# Patient Record
Sex: FEMALE
[De-identification: names, ages, dates, MRNs, and addresses within clinical notes are randomized per-mention and may not be internally consistent; named-entity substitution may affect disease eponyms.]

---

## 2017-09-05 ENCOUNTER — HOSPITAL ENCOUNTER (EMERGENCY)
Dept: HOSPITAL 74 - FER | Age: 42
Discharge: HOME | End: 2017-09-05
Payer: COMMERCIAL

## 2017-09-05 VITALS — BODY MASS INDEX: 24.9 KG/M2

## 2017-09-05 VITALS — HEART RATE: 80 BPM | SYSTOLIC BLOOD PRESSURE: 103 MMHG | TEMPERATURE: 98.1 F | DIASTOLIC BLOOD PRESSURE: 73 MMHG

## 2017-09-05 DIAGNOSIS — I10: ICD-10-CM

## 2017-09-05 DIAGNOSIS — R07.89: Primary | ICD-10-CM

## 2017-09-05 NOTE — PDOC
History of Present Illness





- General


Chief Complaint: Pain, Acute


Stated Complaint: CHEST PAIN


Time Seen by Provider: 09/05/17 22:45


History Source: Patient


Exam Limitations: No Limitations





- History of Present Illness


Initial Comments: 


09/05/17 23:20


This is a 42-year-old female with history of hypertension otherwise she is 

healthy. Patient is complaining of approximately 1 hour pleuritic type chest 

pain. Patient denies any cough, congestion, fever, chills. Patient denies any 

recent viral illness. Patient said that she has small children at home and was 

at [employer] today and so is been doing a fair amount of lifting. Patient said pain 

is exacerbated with taking a deep breath and with movement. Patient denies any 

radiation, nausea, diaphoresis or any other associated symptoms. Patient says 

she is not short of breath.





PAST MEDICAL HISTORY:  no significant history





PAST SURGICAL HISTORY:  no significant history





FAMILY HISTORY:  no pertinant history





SOCIAL HISTORY:  Pt lives with  family and is employed.





MEDICATIONS:  reviewed





ALLERGIES:  As per nursing notes





Review of Systems


General:  No fevers or chills, no weakness, no weight loss 


HEENT: No change in vision.  No sore throat,. No ear pain


CardioVascular:  Pleuritic type chest pain


Respiratory:No cough, or wheezing. 


Gastrointestinal:  no nausea, vomitting, diarrhea or constipation,  No rectal 

bleeding


Genitourinary:  No dysuria, hematuria, or frequency


Musculoskeletal:  No joint or muscle pain or swelling


Neurologic: No headache, vertigo, dizziness or loss of consciousness


Psychiatric: nor depression 


Skin: No rashes or easy bruising


Endocrine: no increased thirst or abnormal weight change


Allergic: no skin or latex allergy


All other systems reviewed and normal








Exam:





General: Well-nourished well-developed individual, no acute distress


HEENT: Throat: Normal, tonsils normal, no erythema or exudate


               Neck: Supple, no meningeal signs, no lymphadenopathy


Eyes::Pupils equal reactive and round, extraocular motion intact


Chest: Pain is reproduced on palpation, 


Cardiac: S1-S2 normal, regular rate and rhythm, no murmurs rubs or gallops


Respiratory: Lungs clear to auscultation bilateral


Abdomen: Soft, nondistended, normal bowel sounds, nontender to palpation 

diffusely


Extremities: Warm, dry, no cyanosis, clubbing, or edema


Skin: No rashes


Neuro: Alert and oriented x3, nonfocal exam, grossly intact, normal gait


Psych: Normal mood and affect





EKG normal sinus rhythm at a rate of 80 no acute ST-T wave changes, normal 

intervals, normal EKG





Assessment and plan: This is a 42-year-old female with pleuritic type chest 

pain. Patient had a normal EKG and her only risk factor for coronary artery 

disease is she reports a history of hypertension however is not on any 

medication for it and her blood pressure here in the emergency room was 103 

systolic. Patient given an anti-inflammatory and told to continue it and follow-

up with her primary care doctor.














Past History





- Past Medical History


Allergies/Adverse Reactions: 


 Allergies











Allergy/AdvReac Type Severity Reaction Status Date / Time


 


No Known Allergies Allergy   Verified 09/05/17 22:49











Home Medications: 


Ambulatory Orders





NK [No Known Home Medication]  09/05/17 











*Physical Exam





- Vital Signs


 Last Vital Signs











Temp Pulse Resp BP Pulse Ox


 


 98.1 F   80   16   103/73   100 


 


 09/05/17 22:50  09/05/17 22:50  09/05/17 22:50  09/05/17 22:50  09/05/17 22:50














ED Treatment Course





- Medications


Given in the ED: 


ED Medications














Discontinued Medications














Generic Name Dose Route Start Last Admin





  Trade Name Freq  PRN Reason Stop Dose Admin


 


Naproxen  500 mg 09/05/17 23:18 09/05/17 23:18





  Naprosyn -  PO 09/05/17 23:19  500 mg





  NOW ONE   Administration














*DC/Admit/Observation/Transfer


Diagnosis at time of Disposition: 


 Pleuritic chest pain





- Discharge Dispostion


Disposition: HOME


Condition at time of disposition: Stable


Admit: No





- Patient Instructions


Additional Instructions: 


Take Naprosyn over-the-counter is Aleve 2 tablets twice a day with food for the 

next week. If not improved in one week follow-up with your primary care doctor.





Return to the emergency department immediately with ANY new, persistent or 

worsening symptoms.





Continue any medications as previously prescribed by your physician.


.


Please make sure your doctor reviews the results of your emergency evaluation.





Thank you for coming to the   Emergency Department today for your care. It was 

a pleasure to see you today. Please note that your evaluation is INCOMPLETE 

until you  follow-up with your doctor.

## 2017-09-06 NOTE — EKG
Test Reason : 

Blood Pressure : ***/*** mmHG

Vent. Rate : 080 BPM     Atrial Rate : 080 BPM

   P-R Int : 160 ms          QRS Dur : 086 ms

    QT Int : 368 ms       P-R-T Axes : 067 020 050 degrees

   QTc Int : 424 ms

 

NORMAL SINUS RHYTHM

 

NO PREVIOUS ECGS AVAILABLE

Confirmed by MD TRACEY, LUZ MARINA ([id number]) on 9/6/2017 2:16:22 PM

 

Referred By: MD DEL TORO           Confirmed By:LUZ MARINA WEBB MD

## 2018-05-27 ENCOUNTER — HOSPITAL ENCOUNTER (EMERGENCY)
Dept: HOSPITAL 74 - FER | Age: 43
Discharge: HOME | End: 2018-05-27
Payer: COMMERCIAL

## 2018-05-27 VITALS — SYSTOLIC BLOOD PRESSURE: 119 MMHG | TEMPERATURE: 98.9 F | HEART RATE: 67 BPM | DIASTOLIC BLOOD PRESSURE: 65 MMHG

## 2018-05-27 DIAGNOSIS — J40: Primary | ICD-10-CM

## 2018-05-27 LAB
ALBUMIN SERPL-MCNC: 3.7 G/DL (ref 3.5–5)
ALP SERPL-CCNC: 40 U/L (ref 32–92)
ALT SERPL-CCNC: 10 U/L (ref 10–40)
AMORPH PHOS CRY URNS QL MICRO: (no result) /HPF
ANION GAP SERPL CALC-SCNC: 7 MMOL/L (ref 8–16)
APPEARANCE UR: (no result)
AST SERPL-CCNC: 19 U/L (ref 10–42)
BACTERIA #/AREA URNS HPF: (no result) /HPF
BILIRUB SERPL-MCNC: 0.3 MG/DL (ref 0.2–1)
BILIRUB UR STRIP.AUTO-MCNC: (no result) MG/DL
BUN SERPL-MCNC: 9 MG/DL (ref 7–18)
CALCIUM SERPL-MCNC: 8.4 MG/DL (ref 8.4–10.2)
CHLORIDE SERPL-SCNC: 101 MMOL/L (ref 98–107)
CO2 SERPL-SCNC: 25 MMOL/L (ref 22–28)
COLOR UR: YELLOW
CREAT SERPL-MCNC: 0.7 MG/DL (ref 0.6–1.3)
DEPRECATED RDW RBC AUTO: 12.2 % (ref 11.6–15.6)
EPITH CASTS URNS QL MICRO: (no result) /HPF
GLUCOSE SERPL-MCNC: 115 MG/DL (ref 74–106)
HCT VFR BLD CALC: 35.8 % (ref 32.4–45.2)
HGB BLD-MCNC: 12.4 GM/DL (ref 10.7–15.3)
KETONES UR QL STRIP: (no result)
LEUKOCYTE ESTERASE UR QL STRIP.AUTO: (no result)
MCH RBC QN AUTO: 30.3 PG (ref 25.7–33.7)
MCHC RBC AUTO-ENTMCNC: 34.7 G/DL (ref 32–36)
MCV RBC: 87.4 FL (ref 80–96)
PH UR: 6 [PH] (ref 4.5–8)
PLATELET # BLD AUTO: 134 K/MM3 (ref 134–434)
PMV BLD: 9.1 FL (ref 7.5–11.1)
POTASSIUM SERPLBLD-SCNC: 3.5 MMOL/L (ref 3.5–5.1)
PROT SERPL-MCNC: 7.1 G/DL (ref 6.4–8.3)
PROT UR QL STRIP: (no result)
RBC # BLD AUTO: (no result) /HPF (ref 0–3)
RBC # BLD AUTO: 4.09 M/MM3 (ref 3.6–5.2)
SODIUM SERPL-SCNC: 133 MMOL/L (ref 136–145)
SP GR UR: 1.02 (ref 1–1.02)
UROBILINOGEN UR STRIP-MCNC: 0.2 MG/DL (ref 0.2–1)
WBC # BLD AUTO: 3.2 K/MM3 (ref 4–10.8)

## 2018-05-27 PROCEDURE — 3E0337Z INTRODUCTION OF ELECTROLYTIC AND WATER BALANCE SUBSTANCE INTO PERIPHERAL VEIN, PERCUTANEOUS APPROACH: ICD-10-PCS

## 2018-05-27 PROCEDURE — 3E033NZ INTRODUCTION OF ANALGESICS, HYPNOTICS, SEDATIVES INTO PERIPHERAL VEIN, PERCUTANEOUS APPROACH: ICD-10-PCS

## 2018-05-27 PROCEDURE — 3E033GC INTRODUCTION OF OTHER THERAPEUTIC SUBSTANCE INTO PERIPHERAL VEIN, PERCUTANEOUS APPROACH: ICD-10-PCS

## 2018-05-27 NOTE — PDOC
History of Present Illness





- General


Chief Complaint: Respiratory


Stated Complaint: COUGH & COLD SX





- History of Present Illness


Initial Comments: 





05/27/18 12:04


42yo female presents c/o body aches, fevers, and a cough productive of green 

sputum. Pt states her daughter had a "cold" and thinks she gave it to her, but 

she is getting worse. States she has been running fevers of 102 at home. States 

last dose of advil was 1 hour pta in the ED. States she has rhinorrhea, sore 

throat, body aches. States decreased PO intake and nausea assoc with the 

illness. denies vomiting or diarrhea. Denies rash. No ha. No neck pain. No 

meningeal signs. Pt is afebrile in the ED. Pt denies urinary complaints. Due 

for her menstrual cycle in 2 days. 





Pmhx: denies


Pshx: denies


Allergies: NKDA


Home Meds: Denies





Past History





- Past Medical History


Allergies/Adverse Reactions: 


 Allergies











Allergy/AdvReac Type Severity Reaction Status Date / Time


 


No Known Allergies Allergy   Verified 05/27/18 11:56











Home Medications: 


Ambulatory Orders





Azithromycin [Zithromax 250mg Tablets -] 250 mg PO UTDICT #6 tab 05/27/18 


D-Methorphan/PE/Acetaminophen [Theraflu Expressmax Cold-Cough] 245.5 ml PO 

ASDIR 05/27/18 


Fluticasone Prop 0.05% Nasal [Flonase -] 1 - 2 spray NS BID #1 spray.pump 05/27/ 18 


Guaifenesin [Mucinex -] 600 mg PO ASDIR 05/27/18 











- Suicide/Smoking/Psychosocial Hx


Smoking History: Never smoked


Have you smoked in the past 12 months: No


Hx Alcohol Use: No


Drug/Substance Use Hx: No


Substance Use Type: None





**Review of Systems





- Review of Systems


Able to Perform ROS?: Yes


Is the patient limited English proficient: No


Constitutional: Yes: Chills, Fever, Malaise, Weakness


HEENTM: Yes: Nose Congestion, Throat Pain


Respiratory: Yes: Cough, Productive cough.  No: Shortness of Breath


Cardiac (ROS): No: Chest Pain, Palpitations


ABD/GI: Yes: Nausea.  No: Diarrhea, Vomiting, Abdominal cramping


: No: Burning, Dysuria


Musculoskeletal: Yes: Other (body aches).  No: Neck Pain


Integumentary: No: Rash


Neurological: No: Headache, Numbness, Paresthesia


All Other Systems: Reviewed and Negative





*Physical Exam





- Vital Signs





05/27/18 12:07


 Selected Entries











  05/27/18





  11:55


 


Temperature 99.1 F


 


Pulse Rate 94 H


 


Respiratory 18





Rate 


 


Respiratory Normal





Depth 


 


Respiratory Non-Labored





Effort 


 


Blood Pressure 102/72


 


Blood Pressure 82





Mean 


 


O2 Sat by Pulse 98





Oximetry (%) 


 


Weight 65.771 kg














- Physical Exam


General Appearance: Yes: Nourished, Appropriately Dressed.  No: Apparent 

Distress


HEENT: positive: EOMI, GERONIMO, Pharyngeal Erythema, Nasal Congestion.  negative: 

Tonsillar Exudate, Tonsillar Erythema, Rhinorrhea


Neck: positive: Trachea midline, Supple.  negative: Rigid, Rigidity


Respiratory/Chest: positive: Lungs Clear, Normal Breath Sounds.  negative: 

Respiratory Distress


Cardiovascular: positive: Regular Rhythm, Regular Rate, S1, S2


Musculoskeletal: positive: Normal Inspection, Other (ambulates with a steady 

gait)


Extremity: positive: Normal Capillary Refill, Normal Inspection, Normal Range 

of Motion.  negative: Calf Tenderness


Integumentary: positive: Normal Color, Dry, Warm


Neurologic: positive: CNs II-XII NML intact, Fully Oriented, Alert, Normal 

Response





ED Treatment Course





- LABORATORY


CBC & Chemistry Diagram: 


 05/27/18 12:15





 05/27/18 12:15





Medical Decision Making





- Medical Decision Making





05/27/18 12:21


a/p: 42yo female with cough/congestion/fevers x 5 days


-viral syndrome vs mucopurulent bronchitis vs pna


-no meningeal signs


-will check labs, cxr


-will hydrate with ivf hydration, zofran for nausea, iv tylenol


-will obtain ua/ucg


-will monitor and reassess





05/27/18 12:52


pt sleeping in the stretcher


resting comfortably








05/27/18 13:31


pt states feeling much better


states her appetite is back


no ha


no nausea


ambulatory to the bathroom


discussed lab results


discussed ua results


pending flu swab


cxr without acute findings





05/27/18 13:41


cxr negative


05/27/18 13:59


influenza negative





suspect bronchitis with nasal congestion


will give rx for azithro and flonase for nasal congestion


discussed all reasons to return to the ED and need for follow up with PMD. 


answered all questions. 


stable for d/c to home





*DC/Admit/Observation/Transfer


Diagnosis at time of Disposition: 


 Bronchitis








- Discharge Dispostion


Disposition: HOME


Condition at time of disposition: Stable


Decision to Admit order: No





- Prescriptions


Prescriptions: 


Azithromycin [Zithromax 250mg Tablets -] 250 mg PO UTDICT #6 tab


Fluticasone Prop 0.05% Nasal [Flonase -] 1 - 2 spray NS BID #1 spray.pump





- Referrals


Referrals: 


Carolynn Morales MD [Primary Care Provider] - 





- Patient Instructions


Printed Discharge Instructions:  DI for Acute Bronchitis


Additional Instructions: 


Please alternate tylenol and motrin for the fever. Please take all medications 

as prescribed. You were given the first dose of azithromycin today in the ED. 

Please start the antibiotic at home tomorrow. Please drink plenty of fluids. 

Please schedule a follow up with your PMD on Tuesday or Wednesday of this week. 





- Post Discharge Activity





- Attestations


Physician Attestion: 





05/27/18 13:44








I, Dr. Yolande Barrios, DO, attest that this document has been prepared under 

my direction and personally reviewed by me in its entirety.   I further attest, 

that it accurately reflects all work, treatment, procedures and medical decision

-making performed by me.